# Patient Record
Sex: MALE | Race: WHITE | NOT HISPANIC OR LATINO | URBAN - METROPOLITAN AREA
[De-identification: names, ages, dates, MRNs, and addresses within clinical notes are randomized per-mention and may not be internally consistent; named-entity substitution may affect disease eponyms.]

---

## 2023-04-19 ENCOUNTER — LAB OUTSIDE AN ENCOUNTER (OUTPATIENT)
Dept: URBAN - METROPOLITAN AREA CLINIC 70 | Facility: CLINIC | Age: 63
End: 2023-04-19

## 2023-04-19 ENCOUNTER — WEB ENCOUNTER (OUTPATIENT)
Dept: URBAN - METROPOLITAN AREA CLINIC 70 | Facility: CLINIC | Age: 63
End: 2023-04-19

## 2023-04-19 ENCOUNTER — OFFICE VISIT (OUTPATIENT)
Dept: URBAN - METROPOLITAN AREA CLINIC 70 | Facility: CLINIC | Age: 63
End: 2023-04-19
Payer: COMMERCIAL

## 2023-04-19 VITALS
WEIGHT: 188 LBS | BODY MASS INDEX: 29.51 KG/M2 | HEIGHT: 67 IN | SYSTOLIC BLOOD PRESSURE: 132 MMHG | HEART RATE: 62 BPM | DIASTOLIC BLOOD PRESSURE: 74 MMHG

## 2023-04-19 DIAGNOSIS — Z86.010 HISTORY OF COLON POLYPS: ICD-10-CM

## 2023-04-19 PROCEDURE — 99203 OFFICE O/P NEW LOW 30 MIN: CPT | Performed by: REGISTERED NURSE

## 2023-04-19 RX ORDER — BROMFENAC SODIUM 0.7 MG/ML
SOLUTION/ DROPS OPHTHALMIC
Qty: 0 | Refills: 0 | Status: DISCONTINUED | COMMUNITY
Start: 2017-01-10

## 2023-04-19 RX ORDER — PREDNISOLONE ACETATE 10 MG/ML
SUSPENSION/ DROPS OPHTHALMIC
Qty: 0 | Refills: 0 | Status: DISCONTINUED | COMMUNITY
Start: 2017-01-10

## 2023-04-19 RX ORDER — LISINOPRIL 20 MG/1
TABLET ORAL
Qty: 0 | Refills: 0 | Status: ACTIVE | COMMUNITY
Start: 2016-03-12

## 2023-04-19 RX ORDER — NADOLOL 20 MG/1
2 TABLETS TABLET ORAL ONCE A DAY
Status: ACTIVE | COMMUNITY

## 2023-04-19 RX ORDER — ROSUVASTATIN CALCIUM 10 MG/1
1 TABLET TABLET, FILM COATED ORAL ONCE A DAY
Status: ACTIVE | COMMUNITY

## 2023-04-19 RX ORDER — POLYMYXIN B SULFATE AND TRIMETHOPRIM SULFATE 10000; 1 [USP'U]/ML; MG/ML
SOLUTION/ DROPS OPHTHALMIC
Qty: 0 | Refills: 0 | Status: DISCONTINUED | COMMUNITY
Start: 2017-01-10

## 2023-04-19 RX ORDER — ALLOPURINOL 300 MG/1
TABLET ORAL
Qty: 0 | Refills: 0 | Status: ACTIVE | COMMUNITY
Start: 2017-01-05

## 2023-04-19 RX ORDER — TADALAFIL 20 MG/1
TABLET, FILM COATED ORAL
Qty: 0 | Refills: 0 | Status: DISCONTINUED | COMMUNITY
Start: 2016-12-27

## 2023-04-19 RX ORDER — OMEPRAZOLE 20 MG/1
1 CAPSULE 30 MINUTES BEFORE MORNING MEAL CAPSULE, DELAYED RELEASE ORAL ONCE A DAY
Status: ACTIVE | COMMUNITY

## 2023-04-19 NOTE — HPI-TODAY'S VISIT:
Pt presents for surveillance colonoscopy due to a history of polyps. Last colonoscopy was done 10/19/17 with finding of tubular adenomas in the transverse colon and cecum. Pt currently denies any abdominal pain, diarrhea, constipation, rectal bleeding or weight loss.

## 2023-05-10 ENCOUNTER — CLAIMS CREATED FROM THE CLAIM WINDOW (OUTPATIENT)
Dept: URBAN - METROPOLITAN AREA SURGERY CENTER 24 | Facility: SURGERY CENTER | Age: 63
End: 2023-05-10

## 2023-05-10 ENCOUNTER — CLAIMS CREATED FROM THE CLAIM WINDOW (OUTPATIENT)
Dept: URBAN - METROPOLITAN AREA SURGERY CENTER 24 | Facility: SURGERY CENTER | Age: 63
End: 2023-05-10
Payer: COMMERCIAL

## 2023-05-10 DIAGNOSIS — D12.5 ADENOMA OF SIGMOID COLON: ICD-10-CM

## 2023-05-10 DIAGNOSIS — Z86.010 HISTORY OF COLON POLYPS: ICD-10-CM

## 2023-05-10 DIAGNOSIS — K63.89 MELANOSIS COLI: ICD-10-CM

## 2023-05-10 PROCEDURE — 45385 COLONOSCOPY W/LESION REMOVAL: CPT | Performed by: INTERNAL MEDICINE

## 2023-05-10 PROCEDURE — G8907 PT DOC NO EVENTS ON DISCHARG: HCPCS | Performed by: INTERNAL MEDICINE

## 2024-03-04 ENCOUNTER — OV NP (OUTPATIENT)
Dept: URBAN - METROPOLITAN AREA CLINIC 70 | Facility: CLINIC | Age: 64
End: 2024-03-04

## 2024-03-04 RX ORDER — NADOLOL 20 MG/1
2 TABLETS TABLET ORAL ONCE A DAY
Status: ACTIVE | COMMUNITY

## 2024-03-04 RX ORDER — ROSUVASTATIN CALCIUM 10 MG/1
1 TABLET TABLET, FILM COATED ORAL ONCE A DAY
Status: ACTIVE | COMMUNITY

## 2024-03-04 RX ORDER — ALLOPURINOL 300 MG/1
TABLET ORAL
Qty: 0 | Refills: 0 | Status: ACTIVE | COMMUNITY
Start: 2017-01-05

## 2024-03-04 RX ORDER — LISINOPRIL 20 MG/1
TABLET ORAL
Qty: 0 | Refills: 0 | Status: ACTIVE | COMMUNITY
Start: 2016-03-12

## 2024-03-04 RX ORDER — OMEPRAZOLE 20 MG/1
1 CAPSULE 30 MINUTES BEFORE MORNING MEAL CAPSULE, DELAYED RELEASE ORAL ONCE A DAY
Status: ACTIVE | COMMUNITY

## 2024-05-16 ENCOUNTER — OFFICE VISIT (OUTPATIENT)
Dept: URBAN - METROPOLITAN AREA CLINIC 70 | Facility: CLINIC | Age: 64
End: 2024-05-16
Payer: COMMERCIAL

## 2024-05-16 ENCOUNTER — DASHBOARD ENCOUNTERS (OUTPATIENT)
Age: 64
End: 2024-05-16

## 2024-05-16 VITALS
HEART RATE: 60 BPM | BODY MASS INDEX: 29.63 KG/M2 | TEMPERATURE: 98.3 F | HEIGHT: 67 IN | DIASTOLIC BLOOD PRESSURE: 78 MMHG | SYSTOLIC BLOOD PRESSURE: 166 MMHG | WEIGHT: 188.8 LBS

## 2024-05-16 DIAGNOSIS — K59.04 CHRONIC IDIOPATHIC CONSTIPATION: ICD-10-CM

## 2024-05-16 DIAGNOSIS — K64.8 INTERNAL HEMORRHOIDS WITH COMPLICATION: ICD-10-CM

## 2024-05-16 PROCEDURE — 99214 OFFICE O/P EST MOD 30 MIN: CPT | Performed by: REGISTERED NURSE

## 2024-05-16 RX ORDER — OMEPRAZOLE 20 MG/1
1 CAPSULE 30 MINUTES BEFORE MORNING MEAL CAPSULE, DELAYED RELEASE ORAL ONCE A DAY
Status: ACTIVE | COMMUNITY

## 2024-05-16 RX ORDER — ALLOPURINOL 300 MG/1
TABLET ORAL
Qty: 0 | Refills: 0 | Status: ACTIVE | COMMUNITY
Start: 2017-01-05

## 2024-05-16 RX ORDER — DICLOFENAC SODIUM 75 MG/1
1 TABLET AS NEEDED TABLET, DELAYED RELEASE ORAL TWICE A DAY
Status: ACTIVE | COMMUNITY

## 2024-05-16 RX ORDER — NADOLOL 20 MG/1
TAKE 1/2 TABLET TWICE A DAY TABLET ORAL
Qty: 30 EACH | Refills: 1 | Status: ACTIVE | COMMUNITY

## 2024-05-16 RX ORDER — LISINOPRIL 20 MG/1
TABLET ORAL
Qty: 0 | Refills: 0 | Status: ACTIVE | COMMUNITY
Start: 2016-03-12

## 2024-05-16 RX ORDER — ROSUVASTATIN 10 MG/1
TAKE 1 TABLET ONCE A DAY TABLET, FILM COATED ORAL
Qty: 30 EACH | Refills: 2 | Status: ACTIVE | COMMUNITY

## 2024-05-16 RX ORDER — ROSUVASTATIN CALCIUM 10 MG/1
1 TABLET TABLET, FILM COATED ORAL ONCE A DAY
Status: DISCONTINUED | COMMUNITY

## 2024-07-25 ENCOUNTER — OFFICE VISIT (OUTPATIENT)
Dept: URBAN - METROPOLITAN AREA CLINIC 70 | Facility: CLINIC | Age: 64
End: 2024-07-25
Payer: COMMERCIAL

## 2024-07-25 VITALS
DIASTOLIC BLOOD PRESSURE: 74 MMHG | TEMPERATURE: 98.1 F | SYSTOLIC BLOOD PRESSURE: 157 MMHG | HEART RATE: 68 BPM | BODY MASS INDEX: 30.39 KG/M2 | HEIGHT: 67 IN | WEIGHT: 193.6 LBS

## 2024-07-25 DIAGNOSIS — K64.8 INTERNAL HEMORRHOIDS WITH COMPLICATION: ICD-10-CM

## 2024-07-25 PROCEDURE — 46221 LIGATION OF HEMORRHOID(S): CPT | Performed by: REGISTERED NURSE

## 2024-07-25 RX ORDER — NADOLOL 20 MG/1
TAKE 1/2 TABLET TWICE A DAY TABLET ORAL
Qty: 30 EACH | Refills: 1 | Status: ACTIVE | COMMUNITY

## 2024-07-25 RX ORDER — LISINOPRIL 20 MG/1
TABLET ORAL
Qty: 0 | Refills: 0 | Status: ACTIVE | COMMUNITY
Start: 2016-03-12

## 2024-07-25 RX ORDER — ROSUVASTATIN 10 MG/1
TAKE 1 TABLET ONCE A DAY TABLET, FILM COATED ORAL
Qty: 30 EACH | Refills: 2 | Status: ACTIVE | COMMUNITY

## 2024-07-25 RX ORDER — ALLOPURINOL 300 MG/1
TABLET ORAL
Qty: 0 | Refills: 0 | Status: ACTIVE | COMMUNITY
Start: 2017-01-05

## 2024-07-25 RX ORDER — OMEPRAZOLE 20 MG/1
1 CAPSULE 30 MINUTES BEFORE MORNING MEAL CAPSULE, DELAYED RELEASE ORAL ONCE A DAY
Status: ACTIVE | COMMUNITY

## 2024-07-25 RX ORDER — DICLOFENAC SODIUM 75 MG/1
1 TABLET AS NEEDED TABLET, DELAYED RELEASE ORAL TWICE A DAY
Status: ACTIVE | COMMUNITY

## 2024-07-25 NOTE — HPI-OTHER HISTORIES
Note from OV 5/16/24: Pt here for c/o hemorrhoids. Symptoms are swelling and protruding tissue after a BM. No rectal pain or bleeding. He has chronic constipation. He is currently having a BM every day with Metamucil and stool softeners but he does not feel like he fully evacuates his bowels. He will have to sit on the toilet 3-4 times before he feels like he is finished. He has had banding previously and a surgical hemorrhoidectomy.    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

## 2024-07-25 NOTE — HPI-TODAY'S VISIT:
Pt here for hemorrhoid banding.  Symptoms are pain, swelling, and protruding tissue after a BM. No improvement with Preparation H. Bowels are moving every day.

## 2024-08-12 ENCOUNTER — OFFICE VISIT (OUTPATIENT)
Dept: URBAN - METROPOLITAN AREA CLINIC 70 | Facility: CLINIC | Age: 64
End: 2024-08-12
Payer: COMMERCIAL

## 2024-08-12 VITALS
DIASTOLIC BLOOD PRESSURE: 80 MMHG | TEMPERATURE: 97.7 F | HEART RATE: 69 BPM | SYSTOLIC BLOOD PRESSURE: 153 MMHG | BODY MASS INDEX: 30.29 KG/M2 | HEIGHT: 67 IN | WEIGHT: 193 LBS

## 2024-08-12 DIAGNOSIS — K64.2 GRADE III HEMORRHOIDS: ICD-10-CM

## 2024-08-12 PROCEDURE — 46221 LIGATION OF HEMORRHOID(S): CPT | Performed by: REGISTERED NURSE

## 2024-08-12 RX ORDER — DICLOFENAC SODIUM 75 MG/1
1 TABLET AS NEEDED TABLET, DELAYED RELEASE ORAL TWICE A DAY
Status: ACTIVE | COMMUNITY

## 2024-08-12 RX ORDER — ALLOPURINOL 300 MG/1
TABLET ORAL
Qty: 0 | Refills: 0 | Status: ACTIVE | COMMUNITY
Start: 2017-01-05

## 2024-08-12 RX ORDER — NADOLOL 20 MG/1
TAKE 1/2 TABLET TWICE A DAY TABLET ORAL
Qty: 30 EACH | Refills: 1 | Status: ACTIVE | COMMUNITY

## 2024-08-12 RX ORDER — OMEPRAZOLE 20 MG/1
1 CAPSULE 30 MINUTES BEFORE MORNING MEAL CAPSULE, DELAYED RELEASE ORAL ONCE A DAY
Status: ACTIVE | COMMUNITY

## 2024-08-12 RX ORDER — LISINOPRIL 20 MG/1
TABLET ORAL
Qty: 0 | Refills: 0 | Status: ACTIVE | COMMUNITY
Start: 2016-03-12

## 2024-08-12 RX ORDER — ROSUVASTATIN 10 MG/1
TAKE 1 TABLET ONCE A DAY TABLET, FILM COATED ORAL
Qty: 30 EACH | Refills: 2 | Status: ACTIVE | COMMUNITY

## 2024-08-12 NOTE — HPI-OTHER HISTORIES
Note from OV 5/16/24: Pt here for c/o hemorrhoids. Symptoms are swelling and protruding tissue after a BM. No rectal pain or bleeding. He has chronic constipation. He is currently having a BM every day with Metamucil and stool softeners but he does not feel like he fully evacuates his bowels. He will have to sit on the toilet 3-4 times before he feels like he is finished. He has had banding previously and a surgical hemorrhoidectomy.    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 Symptoms are pain, swelling, and protruding tissue after a BM. No improvement with Preparation H. Bowels are moving every day. -----------------------------------------------------

## 2024-08-12 NOTE — HPI-TODAY'S VISIT:
Pt here for hemorrhoid banding.  Symptoms have improved significantly after the first banding. He still has some protruding tissue after a BM.

## 2024-08-26 ENCOUNTER — OFFICE VISIT (OUTPATIENT)
Dept: URBAN - METROPOLITAN AREA CLINIC 70 | Facility: CLINIC | Age: 64
End: 2024-08-26
Payer: COMMERCIAL

## 2024-08-26 VITALS
DIASTOLIC BLOOD PRESSURE: 76 MMHG | HEART RATE: 63 BPM | SYSTOLIC BLOOD PRESSURE: 150 MMHG | BODY MASS INDEX: 30.07 KG/M2 | TEMPERATURE: 98.3 F | WEIGHT: 191.6 LBS | HEIGHT: 67 IN

## 2024-08-26 DIAGNOSIS — K64.8 INTERNAL HEMORRHOIDS WITH COMPLICATION: ICD-10-CM

## 2024-08-26 PROCEDURE — 46221 LIGATION OF HEMORRHOID(S): CPT | Performed by: REGISTERED NURSE

## 2024-08-26 RX ORDER — LISINOPRIL 20 MG/1
TABLET ORAL
Qty: 0 | Refills: 0 | Status: ACTIVE | COMMUNITY
Start: 2016-03-12

## 2024-08-26 RX ORDER — ALLOPURINOL 300 MG/1
TABLET ORAL
Qty: 0 | Refills: 0 | Status: ACTIVE | COMMUNITY
Start: 2017-01-05

## 2024-08-26 RX ORDER — OMEPRAZOLE 20 MG/1
1 CAPSULE 30 MINUTES BEFORE MORNING MEAL CAPSULE, DELAYED RELEASE ORAL ONCE A DAY
Status: ACTIVE | COMMUNITY

## 2024-08-26 RX ORDER — NADOLOL 20 MG/1
TAKE 1/2 TABLET TWICE A DAY TABLET ORAL
Qty: 30 EACH | Refills: 1 | Status: ACTIVE | COMMUNITY

## 2024-08-26 RX ORDER — ROSUVASTATIN 10 MG/1
TAKE 1 TABLET ONCE A DAY TABLET, FILM COATED ORAL
Qty: 30 EACH | Refills: 2 | Status: ACTIVE | COMMUNITY

## 2024-08-26 RX ORDER — DICLOFENAC SODIUM 75 MG/1
1 TABLET AS NEEDED TABLET, DELAYED RELEASE ORAL TWICE A DAY
Status: ACTIVE | COMMUNITY

## 2024-08-26 NOTE — HPI-OTHER HISTORIES
Note from OV 5/16/24: Pt here for c/o hemorrhoids. Symptoms are swelling and protruding tissue after a BM. No rectal pain or bleeding. He has chronic constipation. He is currently having a BM every day with Metamucil and stool softeners but he does not feel like he fully evacuates his bowels. He will have to sit on the toilet 3-4 times before he feels like he is finished. He has had banding previously and a surgical hemorrhoidectomy.    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 Symptoms are pain, swelling, and protruding tissue after a BM. No improvement with Preparation H. Bowels are moving every day. - - - - - - - - - - - - - - - - - - - - - - - - - - - Note from OV 8/12/24: Pt here for hemorrhoid banding. Symptoms have improved significantly after the first banding. He still has some protruding tissue after a BM. - - - - - - - - - - - - - - - - - - - - - - - - - - - -

## 2024-09-23 ENCOUNTER — OFFICE VISIT (OUTPATIENT)
Dept: URBAN - METROPOLITAN AREA CLINIC 70 | Facility: CLINIC | Age: 64
End: 2024-09-23
Payer: COMMERCIAL

## 2024-09-23 VITALS
HEART RATE: 63 BPM | WEIGHT: 193.4 LBS | HEIGHT: 67 IN | TEMPERATURE: 97.7 F | BODY MASS INDEX: 30.35 KG/M2 | SYSTOLIC BLOOD PRESSURE: 170 MMHG | DIASTOLIC BLOOD PRESSURE: 75 MMHG

## 2024-09-23 DIAGNOSIS — K64.2 GRADE III INTERNAL HEMORRHOIDS: ICD-10-CM

## 2024-09-23 PROCEDURE — 46221 LIGATION OF HEMORRHOID(S): CPT | Performed by: REGISTERED NURSE

## 2024-09-23 RX ORDER — NADOLOL 20 MG/1
TAKE 1/2 TABLET TWICE A DAY TABLET ORAL
Qty: 30 EACH | Refills: 1 | Status: ACTIVE | COMMUNITY

## 2024-09-23 RX ORDER — DICLOFENAC SODIUM 75 MG/1
1 TABLET AS NEEDED TABLET, DELAYED RELEASE ORAL TWICE A DAY
Status: ACTIVE | COMMUNITY

## 2024-09-23 RX ORDER — OMEPRAZOLE 20 MG/1
1 CAPSULE 30 MINUTES BEFORE MORNING MEAL CAPSULE, DELAYED RELEASE ORAL ONCE A DAY
Status: ACTIVE | COMMUNITY

## 2024-09-23 RX ORDER — LISINOPRIL 20 MG/1
TABLET ORAL
Qty: 0 | Refills: 0 | Status: ACTIVE | COMMUNITY
Start: 2016-03-12

## 2024-09-23 RX ORDER — ALLOPURINOL 300 MG/1
TABLET ORAL
Qty: 0 | Refills: 0 | Status: ACTIVE | COMMUNITY
Start: 2017-01-05

## 2024-09-23 RX ORDER — AZITHROMYCIN MONOHYDRATE 250 MG/1
AS DIRECTED TABLET, FILM COATED ORAL
Status: ACTIVE | COMMUNITY

## 2024-09-23 RX ORDER — ROSUVASTATIN 10 MG/1
TAKE 1 TABLET ONCE A DAY TABLET, FILM COATED ORAL
Qty: 30 EACH | Refills: 2 | Status: ACTIVE | COMMUNITY

## 2024-09-23 NOTE — HPI-TODAY'S VISIT:
Pt here for hemorrhoid banding.  He reports a hemorrhoid protruding on the left side every time he has a BM.

## 2024-09-23 NOTE — HPI-OTHER HISTORIES
Note from OV 5/16/24: Pt here for c/o hemorrhoids. Symptoms are swelling and protruding tissue after a BM. No rectal pain or bleeding. He has chronic constipation. He is currently having a BM every day with Metamucil and stool softeners but he does not feel like he fully evacuates his bowels. He will have to sit on the toilet 3-4 times before he feels like he is finished. He has had banding previously and a surgical hemorrhoidectomy.    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 Symptoms are pain, swelling, and protruding tissue after a BM. No improvement with Preparation H. Bowels are moving every day. - - - - - - - - - - - - - - - - - - - - - - - - - - - Note from OV 8/12/24: Pt here for hemorrhoid banding. Symptoms have improved significantly after the first banding. He still has some protruding tissue after a BM. - - - - - - - - - - - - - - - - - - - - - - - - - - - - Note from OV 8/26/24: Pt here for hemorrhoid banding. Symptoms are protruding tissue after a BM. - - - - - - - - - - - - - - - - - - - - - - - -

## 2024-09-24 ENCOUNTER — TELEPHONE ENCOUNTER (OUTPATIENT)
Dept: URBAN - METROPOLITAN AREA CLINIC 70 | Facility: CLINIC | Age: 64
End: 2024-09-24

## 2024-10-07 ENCOUNTER — OFFICE VISIT (OUTPATIENT)
Dept: URBAN - METROPOLITAN AREA CLINIC 70 | Facility: CLINIC | Age: 64
End: 2024-10-07

## 2024-10-07 RX ORDER — DICLOFENAC SODIUM 75 MG/1
1 TABLET AS NEEDED TABLET, DELAYED RELEASE ORAL TWICE A DAY
Status: ACTIVE | COMMUNITY

## 2024-10-07 RX ORDER — ALLOPURINOL 300 MG/1
TABLET ORAL
Qty: 0 | Refills: 0 | Status: ACTIVE | COMMUNITY
Start: 2017-01-05

## 2024-10-07 RX ORDER — NADOLOL 20 MG/1
TAKE 1/2 TABLET TWICE A DAY TABLET ORAL
Qty: 30 EACH | Refills: 1 | Status: ACTIVE | COMMUNITY

## 2024-10-07 RX ORDER — AZITHROMYCIN MONOHYDRATE 250 MG/1
AS DIRECTED TABLET, FILM COATED ORAL
Status: ACTIVE | COMMUNITY

## 2024-10-07 RX ORDER — ROSUVASTATIN 10 MG/1
TAKE 1 TABLET ONCE A DAY TABLET, FILM COATED ORAL
Qty: 30 EACH | Refills: 2 | Status: ACTIVE | COMMUNITY

## 2024-10-07 RX ORDER — LISINOPRIL 20 MG/1
TABLET ORAL
Qty: 0 | Refills: 0 | Status: ACTIVE | COMMUNITY
Start: 2016-03-12

## 2024-10-07 RX ORDER — OMEPRAZOLE 20 MG/1
1 CAPSULE 30 MINUTES BEFORE MORNING MEAL CAPSULE, DELAYED RELEASE ORAL ONCE A DAY
Status: ACTIVE | COMMUNITY

## 2024-10-08 ENCOUNTER — OFFICE VISIT (OUTPATIENT)
Dept: URBAN - METROPOLITAN AREA CLINIC 70 | Facility: CLINIC | Age: 64
End: 2024-10-08
Payer: COMMERCIAL

## 2024-10-08 VITALS
HEIGHT: 67 IN | HEART RATE: 74 BPM | WEIGHT: 194.6 LBS | SYSTOLIC BLOOD PRESSURE: 163 MMHG | TEMPERATURE: 97.9 F | DIASTOLIC BLOOD PRESSURE: 82 MMHG | BODY MASS INDEX: 30.54 KG/M2

## 2024-10-08 DIAGNOSIS — K64.8 INTERNAL HEMORRHOIDS WITH COMPLICATION: ICD-10-CM

## 2024-10-08 PROCEDURE — 992 NON-BILLABLE: Performed by: REGISTERED NURSE

## 2024-10-08 RX ORDER — ROSUVASTATIN 10 MG/1
TAKE 1 TABLET ONCE A DAY TABLET, FILM COATED ORAL
Qty: 30 EACH | Refills: 2 | Status: ACTIVE | COMMUNITY

## 2024-10-08 RX ORDER — DICLOFENAC SODIUM 75 MG/1
1 TABLET AS NEEDED TABLET, DELAYED RELEASE ORAL TWICE A DAY
Status: ACTIVE | COMMUNITY

## 2024-10-08 RX ORDER — LISINOPRIL 20 MG/1
TABLET ORAL
Qty: 0 | Refills: 0 | Status: ACTIVE | COMMUNITY
Start: 2016-03-12

## 2024-10-08 RX ORDER — ALLOPURINOL 300 MG/1
TABLET ORAL
Qty: 0 | Refills: 0 | Status: ACTIVE | COMMUNITY
Start: 2017-01-05

## 2024-10-08 RX ORDER — NADOLOL 20 MG/1
TAKE 1/2 TABLET TWICE A DAY TABLET ORAL
Qty: 30 EACH | Refills: 1 | Status: ACTIVE | COMMUNITY

## 2024-10-08 RX ORDER — OMEPRAZOLE 20 MG/1
1 CAPSULE 30 MINUTES BEFORE MORNING MEAL CAPSULE, DELAYED RELEASE ORAL ONCE A DAY
Status: ACTIVE | COMMUNITY

## 2024-10-08 RX ORDER — AZITHROMYCIN MONOHYDRATE 250 MG/1
AS DIRECTED TABLET, FILM COATED ORAL
Status: ACTIVE | COMMUNITY

## 2024-10-08 NOTE — HPI-TODAY'S VISIT:
Pt here for hemorrhoid banding.  He reports bleeding and protruding tissue after a BM. The previous band on 9/23/24 came off a few hours after it was done on 9/23/24.

## 2024-10-08 NOTE — HPI-OTHER HISTORIES
Note from OV 5/16/24: Pt here for c/o hemorrhoids. Symptoms are swelling and protruding tissue after a BM. No rectal pain or bleeding. He has chronic constipation. He is currently having a BM every day with Metamucil and stool softeners but he does not feel like he fully evacuates his bowels. He will have to sit on the toilet 3-4 times before he feels like he is finished. He has had banding previously and a surgical hemorrhoidectomy.    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 Symptoms are pain, swelling, and protruding tissue after a BM. No improvement with Preparation H. Bowels are moving every day. - - - - - - - - - - - - - - - - - - - - - - - - - - - Note from OV 8/12/24: Pt here for hemorrhoid banding. Symptoms have improved significantly after the first banding. He still has some protruding tissue after a BM. - - - - - - - - - - - - - - - - - - - - - - - - - - - - Note from OV 8/26/24: Pt here for hemorrhoid banding. Symptoms are protruding tissue after a BM. - - - - - - - - - - - - - - - - - - - - - - - - Note from OV 9/23/24: Pt here for hemorrhoid banding. He reports a hemorrhoid protruding on the left side every time he has a BM. - - - - - - - - - - - - - - - - - - - - - - - - -

## 2024-10-09 ENCOUNTER — TELEPHONE ENCOUNTER (OUTPATIENT)
Dept: URBAN - METROPOLITAN AREA CLINIC 70 | Facility: CLINIC | Age: 64
End: 2024-10-09

## 2024-12-23 ENCOUNTER — OFFICE VISIT (OUTPATIENT)
Dept: URBAN - METROPOLITAN AREA CLINIC 70 | Facility: CLINIC | Age: 64
End: 2024-12-23
Payer: COMMERCIAL

## 2024-12-23 VITALS
TEMPERATURE: 98.2 F | HEIGHT: 67 IN | HEART RATE: 77 BPM | DIASTOLIC BLOOD PRESSURE: 82 MMHG | OXYGEN SATURATION: 96 % | WEIGHT: 196.8 LBS | SYSTOLIC BLOOD PRESSURE: 176 MMHG | BODY MASS INDEX: 30.89 KG/M2

## 2024-12-23 DIAGNOSIS — K64.2 GRADE III INTERNAL HEMORRHOIDS: ICD-10-CM

## 2024-12-23 PROCEDURE — 46221 LIGATION OF HEMORRHOID(S): CPT | Performed by: REGISTERED NURSE

## 2024-12-23 RX ORDER — AZITHROMYCIN MONOHYDRATE 250 MG/1
AS DIRECTED TABLET, FILM COATED ORAL
Status: ON HOLD | COMMUNITY

## 2024-12-23 RX ORDER — NADOLOL 20 MG/1
TAKE 1/2 TABLET TWICE A DAY TABLET ORAL
Qty: 30 EACH | Refills: 1 | Status: ON HOLD | COMMUNITY

## 2024-12-23 RX ORDER — SOTALOL HYDROCHLORIDE 80 MG/1
TAKE 1 TABLET BY MOUTH TWICE A DAY TABLET ORAL
Qty: 60 EACH | Refills: 0 | Status: ACTIVE | COMMUNITY

## 2024-12-23 RX ORDER — DICLOFENAC SODIUM 75 MG/1
1 TABLET AS NEEDED TABLET, DELAYED RELEASE ORAL TWICE A DAY
Status: ON HOLD | COMMUNITY

## 2024-12-23 RX ORDER — ALLOPURINOL 300 MG/1
TABLET ORAL
Qty: 0 | Refills: 0 | Status: ACTIVE | COMMUNITY
Start: 2017-01-05

## 2024-12-23 RX ORDER — APIXABAN 5 MG/1
TAKE 1 TABLET BY MOUTH TWICE A DAY TABLET, FILM COATED ORAL
Qty: 60 EACH | Refills: 0 | Status: ACTIVE | COMMUNITY

## 2024-12-23 RX ORDER — LISINOPRIL 20 MG/1
TABLET ORAL
Qty: 0 | Refills: 0 | Status: ACTIVE | COMMUNITY
Start: 2016-03-12

## 2024-12-23 RX ORDER — ROSUVASTATIN 10 MG/1
TAKE 1 TABLET ONCE A DAY TABLET, FILM COATED ORAL
Qty: 30 EACH | Refills: 2 | Status: ACTIVE | COMMUNITY

## 2024-12-23 RX ORDER — OMEPRAZOLE 20 MG/1
1 CAPSULE 30 MINUTES BEFORE MORNING MEAL CAPSULE, DELAYED RELEASE ORAL ONCE A DAY
Status: ACTIVE | COMMUNITY

## 2024-12-23 NOTE — HPI-OTHER HISTORIES
Note from OV 5/16/24: Pt here for c/o hemorrhoids. Symptoms are swelling and protruding tissue after a BM. No rectal pain or bleeding. He has chronic constipation. He is currently having a BM every day with Metamucil and stool softeners but he does not feel like he fully evacuates his bowels. He will have to sit on the toilet 3-4 times before he feels like he is finished. He has had banding previously and a surgical hemorrhoidectomy.    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 Symptoms are pain, swelling, and protruding tissue after a BM. No improvement with Preparation H. Bowels are moving every day. - - - - - - - - - - - - - - - - - - - - - - - - - - - Note from OV 8/12/24: Pt here for hemorrhoid banding. Symptoms have improved significantly after the first banding. He still has some protruding tissue after a BM. - - - - - - - - - - - - - - - - - - - - - - - - - - - - Note from OV 8/26/24: Pt here for hemorrhoid banding. Symptoms are protruding tissue after a BM. - - - - - - - - - - - - - - - - - - - - - - - - Note from OV 9/23/24: Pt here for hemorrhoid banding. He reports a hemorrhoid protruding on the left side every time he has a BM. - - - - - - - - - - - - - - - - - - - - - - - - - Note from OV 10/8/24: Pt here for hemorrhoid banding. He reports bleeding and protruding tissue after a BM. The previous band on 9/23/24 came off a few hours after it was done on 9/23/24. - - - - - - - - - - - - - - - - - - - - - - - - -

## 2025-01-13 ENCOUNTER — OFFICE VISIT (OUTPATIENT)
Dept: URBAN - METROPOLITAN AREA CLINIC 70 | Facility: CLINIC | Age: 65
End: 2025-01-13

## 2025-08-04 ENCOUNTER — OFFICE VISIT (OUTPATIENT)
Dept: URBAN - METROPOLITAN AREA CLINIC 70 | Facility: CLINIC | Age: 65
End: 2025-08-04
Payer: COMMERCIAL

## 2025-08-04 DIAGNOSIS — K64.2 GRADE III HEMORRHOIDS: ICD-10-CM

## 2025-08-04 DIAGNOSIS — K64.8 INTERNAL HEMORRHOIDS WITH COMPLICATION: ICD-10-CM

## 2025-08-04 PROCEDURE — 46221 LIGATION OF HEMORRHOID(S): CPT | Performed by: REGISTERED NURSE

## 2025-08-04 RX ORDER — ROSUVASTATIN 10 MG/1
TAKE 1 TABLET ONCE A DAY TABLET, FILM COATED ORAL
Qty: 30 EACH | Refills: 2 | Status: ACTIVE | COMMUNITY

## 2025-08-04 RX ORDER — NADOLOL 20 MG/1
TAKE 1/2 TABLET TWICE A DAY TABLET ORAL
Qty: 30 EACH | Refills: 1 | Status: ON HOLD | COMMUNITY

## 2025-08-04 RX ORDER — AZITHROMYCIN MONOHYDRATE 250 MG/1
AS DIRECTED TABLET, FILM COATED ORAL
Status: ON HOLD | COMMUNITY

## 2025-08-04 RX ORDER — SOTALOL HYDROCHLORIDE 80 MG/1
TAKE 1 TABLET BY MOUTH TWICE A DAY TABLET ORAL
Qty: 60 EACH | Refills: 0 | Status: ACTIVE | COMMUNITY

## 2025-08-04 RX ORDER — DICLOFENAC SODIUM 75 MG/1
1 TABLET AS NEEDED TABLET, DELAYED RELEASE ORAL TWICE A DAY
Status: ON HOLD | COMMUNITY

## 2025-08-04 RX ORDER — LISINOPRIL 40 MG/1
1 TABLET TABLET ORAL ONCE A DAY
Refills: 0 | Status: ACTIVE | COMMUNITY
Start: 2016-03-12

## 2025-08-04 RX ORDER — ALLOPURINOL 300 MG/1
TABLET ORAL
Qty: 0 | Refills: 0 | Status: ACTIVE | COMMUNITY
Start: 2017-01-05

## 2025-08-04 RX ORDER — OMEPRAZOLE 20 MG/1
1 CAPSULE 30 MINUTES BEFORE MORNING MEAL CAPSULE, DELAYED RELEASE ORAL ONCE A DAY
Status: ACTIVE | COMMUNITY

## 2025-08-04 RX ORDER — APIXABAN 5 MG/1
TAKE 1 TABLET BY MOUTH TWICE A DAY TABLET, FILM COATED ORAL
Qty: 60 EACH | Refills: 0 | Status: ACTIVE | COMMUNITY

## 2025-08-26 ENCOUNTER — OFFICE VISIT (OUTPATIENT)
Dept: URBAN - METROPOLITAN AREA CLINIC 70 | Facility: CLINIC | Age: 65
End: 2025-08-26
Payer: COMMERCIAL

## 2025-08-26 DIAGNOSIS — K64.2 GRADE III HEMORRHOIDS: ICD-10-CM

## 2025-08-26 PROCEDURE — 46221 LIGATION OF HEMORRHOID(S): CPT | Performed by: REGISTERED NURSE

## 2025-08-26 RX ORDER — NADOLOL 20 MG/1
TAKE 1/2 TABLET TWICE A DAY TABLET ORAL
Qty: 30 EACH | Refills: 1 | Status: ON HOLD | COMMUNITY

## 2025-08-26 RX ORDER — SOTALOL HYDROCHLORIDE 80 MG/1
TAKE 1 TABLET BY MOUTH TWICE A DAY TABLET ORAL
Qty: 60 EACH | Refills: 0 | Status: ACTIVE | COMMUNITY

## 2025-08-26 RX ORDER — APIXABAN 5 MG/1
TAKE 1 TABLET BY MOUTH TWICE A DAY TABLET, FILM COATED ORAL
Qty: 60 EACH | Refills: 0 | Status: ACTIVE | COMMUNITY

## 2025-08-26 RX ORDER — OMEPRAZOLE 20 MG/1
1 CAPSULE 30 MINUTES BEFORE MORNING MEAL CAPSULE, DELAYED RELEASE ORAL ONCE A DAY
Status: ACTIVE | COMMUNITY

## 2025-08-26 RX ORDER — ALLOPURINOL 300 MG/1
TABLET ORAL
Qty: 0 | Refills: 0 | Status: ACTIVE | COMMUNITY
Start: 2017-01-05

## 2025-08-26 RX ORDER — AZITHROMYCIN MONOHYDRATE 250 MG/1
AS DIRECTED TABLET, FILM COATED ORAL
Status: ON HOLD | COMMUNITY

## 2025-08-26 RX ORDER — ROSUVASTATIN 10 MG/1
TAKE 1 TABLET ONCE A DAY TABLET, FILM COATED ORAL
Qty: 30 EACH | Refills: 2 | Status: ACTIVE | COMMUNITY

## 2025-08-26 RX ORDER — LISINOPRIL 40 MG/1
1 TABLET TABLET ORAL ONCE A DAY
Refills: 0 | Status: ACTIVE | COMMUNITY
Start: 2016-03-12

## 2025-08-26 RX ORDER — DICLOFENAC SODIUM 75 MG/1
1 TABLET AS NEEDED TABLET, DELAYED RELEASE ORAL TWICE A DAY
Status: ON HOLD | COMMUNITY